# Patient Record
Sex: FEMALE | URBAN - NONMETROPOLITAN AREA
[De-identification: names, ages, dates, MRNs, and addresses within clinical notes are randomized per-mention and may not be internally consistent; named-entity substitution may affect disease eponyms.]

---
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
## 2024-01-13 NOTE — HPI: RASH (ECZEMA)
[facility] MEDICINE  DISCHARGE SUMMARY     Primary Care Physician: Miguel Rodriguez  Admission Date: 1/12/2024   Discharge Provider: Vince Duarte MD Discharge Date: 1/13/2024   Diet:   Active Diet and Nourishment Order   Procedures     Diet     Combination Diet Moderate Consistent Carb (60 g CHO per Meal) Diet; Low Saturated Fat Na <2400mg Diet       Code Status: Full Code   Activity: DCACTIVITY: Activity as tolerated        Condition at Discharge: Stable     REASON FOR PRESENTATION(See Admission Note for Details)     L sided weakness and visual disturbance    PRINCIPAL & ACTIVE DISCHARGE DIAGNOSES     Principal Problem:    Stroke-like symptoms  Active Problems:    TIA (transient ischemic attack)    Benign essential hypertension    Hyperlipidemia LDL goal <70    Obesity    Fatty liver    Type 2 diabetes mellitus (H)      PENDING LABS     Unresulted Labs Ordered in the Past 30 Days of this Admission       No orders found for last 31 day(s).              PROCEDURES ( this hospitalization only)          RECOMMENDATIONS TO OUTPATIENT PROVIDER FOR F/U VISIT     Follow-up Appointments     Follow-up and recommended labs and tests       Follow up with primary care provider, Miguel Rodriguez, within 7 days for   hospital follow- up.  The following labs/tests are recommended: BMP.      Hypertension and diabetes management.              DISPOSITION     Home    SUMMARY OF HOSPITAL COURSE:      50M with HTN, DM2 who presented with L sided weakness and visual disturbance (spinning sensation in periphery of vision) that lasted about 10minutes before resolving on its own.  No new CVA on MRI, but with evidence of old lacunar infarct and microvascular disease.  CTA with intracranial P2 stenoses.  Symptoms resolved and Mr. Stephenson was discharged to home.  He will need close follow-up with primary care for ongoing risk factor management.      #TIA -   -asa 81, plavix 75 x 3 
weeks  -lipitor  -secondary prevention.  Needs better DM and HTN control.     #DM2 - A1c 8.3.    -Resume metformin, start glipizide for now.    -Increase BG checks to TID from qday at home.    -I think would be a good candidate for GLP1a (I.e ozempic).  Is in discussion with PCP for this, and he will follow-up.     #HTN -   -resume hydrochlorothiazide, losartan.    -Goal over time to be < 130/80.  Has monitor at home and provided script for coreg 3.25 BID if blood pressures still running above goal next week.     #Thyroid nodule - outpatient follow-up.   Pt aware of this.     #Morbid obesity - Body mass index is 38.34 kg/m .     #?DEJA - sleep med referral.    Discharge Medications with Med changes:     Current Discharge Medication List        START taking these medications    Details   aspirin 81 MG EC tablet Take 1 tablet (81 mg) by mouth daily  Qty: 30 tablet, Refills: 9    Associated Diagnoses: TIA (transient ischemic attack)      carvedilol (COREG) 3.125 MG tablet Take 1 tablet (3.125 mg) by mouth 2 times daily (with meals) Start if blood pressure is still running greater than 130/80  Qty: 60 tablet, Refills: 1    Associated Diagnoses: Benign essential hypertension      clopidogrel (PLAVIX) 75 MG tablet Take 1 tablet (75 mg) by mouth daily  Qty: 21 tablet, Refills: 0    Associated Diagnoses: TIA (transient ischemic attack)      glipiZIDE (GLUCOTROL) 5 MG tablet Take 1 tablet (5 mg) by mouth every morning (before breakfast)  Qty: 30 tablet, Refills: 3    Associated Diagnoses: Type 2 diabetes mellitus without complication, without long-term current use of insulin (H)           CONTINUE these medications which have NOT CHANGED    Details   atorvastatin (LIPITOR) 10 MG tablet Take 10 mg by mouth every morning      hydrochlorothiazide (HYDRODIURIL) 25 MG tablet Take 25 mg by mouth every morning      losartan (COZAAR) 100 MG tablet Take 100 mg by mouth every morning      metFORMIN (GLUCOPHAGE XR) 500 MG 24 hr tablet 
Take 1,000 mg by mouth 2 times daily (with meals)      Multiple Vitamins-Minerals (MENS MULTIVITAMIN PO) Take 1 tablet by mouth every morning           STOP taking these medications       ibuprofen (ADVIL/MOTRIN) 200 MG tablet Comments:   Reason for Stopping:                     Rationale for medication changes:        As above      Consults     NEUROLOGY IP STROKE CONSULT  SPEECH LANGUAGE PATH ADULT IP CONSULT  PHARMACY IP CONSULT  PHARMACY IP CONSULT  PHARMACY IP CONSULT  PHYSICAL THERAPY ADULT IP CONSULT  OCCUPATIONAL THERAPY ADULT IP CONSULT  REHAB ADMISSIONS LIAISON IP CONSULT  CARE MANAGEMENT / SOCIAL WORK IP CONSULT  NEUROLOGY IP STROKE CONSULT  NEUROLOGY IP CONSULT  SMOKING CESSATION PROGRAM IP CONSULT    Immunizations given this encounter     Most Recent Immunizations   Administered Date(s) Administered     COVID-19 MONOVALENT 12+ ([employer]) 08/27/2021           Anticoagulation Information            SIGNIFICANT IMAGING FINDINGS     Results for orders placed or performed during the hospital encounter of 01/12/24   CTA Head Neck with Contrast    Impression    IMPRESSION:   HEAD CT:  1.  No acute intracranial process or significant change since 2037 hours.    HEAD CTA:   1.  No large vessel occlusion or proximal branch stenosis.  2.  Multifocal severe right P2 stenoses with distal flow present.    NECK CTA:  1.  No large vessel occlusion or hemodynamically significant stenosis.  2.  2.1 cm left thyroid nodule. Outpatient ultrasound is recommended to further characterize.    Dr. Lujan was contacted by me on 1/12/2024 9:38 PM and 9:48 PM CST with the CT head and CTA preliminary report, respectively.   CT Head Perfusion w Contrast - For Tier 2 Stroke    Impression    IMPRESSION:   1.  No acute infarct or tissue at risk per RAPID analysis.  2.  Subjectively there is elevated Tmax in the right PCA distribution corresponding to known stenoses.   MR Brain w/o Contrast    Impression    IMPRESSION:  1.  No acute 
"infarct, mass, hemorrhage, or herniation.  2.  Small probable chronic infarct in the right corona radiata.  3.  A few nonspecific white matter lesions which are most likely due to chronic microvascular ischemic disease.           Discharge Orders        Adult Sleep Eval & Management Referral      Reason for your hospital stay    You were admitted with likely TIA (\"mini stroke\").      You need ongoing management of diabetes and hypertension (goal BP < 130/80)  to reduce your risk of future strokes.     Follow-up and recommended labs and tests     Follow up with primary care provider, Miguel Rodriguez, within 7 days for hospital follow- up.  The following labs/tests are recommended: BMP.      Hypertension and diabetes management.     Activity    Your activity upon discharge: activity as tolerated     Monitor and record    blood glucose 4 times a day, before meals and at bedtime  blood pressure twice daily  Record above measurements and bring to your primary care visits.     Diet    Follow this diet upon discharge: Orders Placed This Encounter      Combination Diet Moderate Consistent Carb (60 g CHO per Meal) Diet; Low Saturated Fat Na <2400mg Diet     Stroke Hospital Follow Up    Please be aware that coverage of these services is subject to the terms and limitations of your health insurance plan.  Call member services at your health plan with any benefit or coverage questions.  [facility] will call you to coordinate care as prescribed by your provider. If you don t hear from a representative within 2 business days, please call [phone].         Examination   Physical Exam   Temp:  [97.8  F (36.6  C)-98.3  F (36.8  C)] 98.3  F (36.8  C)  Pulse:  [] 104  Resp:  [15-26] 18  BP: (132-209)/() 145/81  SpO2:  [94 %-96 %] 95 %  Wt Readings from Last 1 Encounters:   01/13/24 131.8 kg (290 lb 9.1 oz)         Please see EMR for more detailed significant labs, imaging, consultant notes etc.      Vince "
MD Gerald  [facility]    CC:Miguel Rodriguez    
How Severe Is Your Eczema?: mild
Is This A New Presentation, Or A Follow-Up?: Rash

## 2024-06-22 ENCOUNTER — OFFICE VISIT (OUTPATIENT)
Dept: URGENT CARE | Facility: CLINIC | Age: 19
End: 2024-06-22
Payer: COMMERCIAL

## 2024-06-22 VITALS
RESPIRATION RATE: 18 BRPM | SYSTOLIC BLOOD PRESSURE: 106 MMHG | TEMPERATURE: 98.2 F | DIASTOLIC BLOOD PRESSURE: 64 MMHG | HEART RATE: 84 BPM | OXYGEN SATURATION: 100 %

## 2024-06-22 DIAGNOSIS — J02.9 ACUTE PHARYNGITIS, UNSPECIFIED ETIOLOGY: Primary | ICD-10-CM

## 2024-06-22 PROCEDURE — 87880 STREP A ASSAY W/OPTIC: CPT

## 2024-06-22 PROCEDURE — 99203 OFFICE O/P NEW LOW 30 MIN: CPT

## 2024-06-22 PROCEDURE — S9088 SERVICES PROVIDED IN URGENT: HCPCS

## 2024-06-22 RX ORDER — AMOXICILLIN 500 MG/1
500 CAPSULE ORAL EVERY 12 HOURS SCHEDULED
Qty: 20 CAPSULE | Refills: 0 | Status: SHIPPED | OUTPATIENT
Start: 2024-06-22 | End: 2024-07-02

## 2024-06-22 RX ORDER — LORATADINE 10 MG/1
10 TABLET ORAL DAILY
COMMUNITY

## 2024-06-22 NOTE — PROGRESS NOTES
[facility]        NAME: Graciela Hollis is a 18 y.o. female  : 2005    MRN: [id number]  DATE: 2024  TIME: 2:50 PM    Assessment and Plan   Acute pharyngitis, unspecified etiology [J02.9]  1. Acute pharyngitis, unspecified etiology  amoxicillin (AMOXIL) 500 mg capsule        Based on appearance of oropharynx and tonsils, patient was placed on antibiotics.  Will follow-up with primary care provider if not better in approximately 3 days.     Patient Instructions     Strep throat is contagious. It's spread through droplets such as when you sneeze ann cough. It is also shared through shared food and drinks. You should avoid sharing any cups, utensils, drinks/food etc. It can also be picked up from surfaces which have been touched if someone sneezes into their hand then touches the surface. Once starting antibiotics strep throat usually is no longer contagious after 24-48 hours.     You should replace your toothbrush after strep throat. I recommend you replace it after 2-3 days of antibiotic use. Be sure to treat your symptoms to help you feel better faster. Making sure you get plenty of fluids, and treating any fevers with normal over the counter medications. You may also try over the counter throat spray or throat lozenges if appropriate to help sooth the throat. (Throat lozenges are not recommended in young children as they can be a choking hazard).     You may take over the counter Tylenol (Acetaminophen) and/or Motrin (Ibuprofen) as needed, as directed on packaging.      Warm salt water gargles for the throat pain.       Follow up with PCP in 3-5 days.  Proceed to  ER if symptoms worsen.    If tests are performed, our office will contact you with results only if changes need to made to the care plan discussed with you at the visit. You can review your full results on [facility].    Chief Complaint     Chief Complaint   Patient presents with    Sore Throat    Mass     Feels like  has lumps on both sides of the inside of her throat  Got much worse yesterday         History of Present Illness       18-year-old female patient presents complaining of sore throat, left greater than right side.  Feels like she has lumps on both sides of the inside of her throat and it got much worse today.        Review of Systems   Review of Systems   Constitutional:  Negative for activity change, appetite change, chills, fatigue and fever.   HENT:  Positive for ear pain, sore throat, trouble swallowing (able to swallow-pain with swallowing) and voice change. Negative for congestion, drooling and ear discharge.    Eyes:  Negative for pain, discharge, redness and itching.   Respiratory:  Negative for cough and shortness of breath.    Gastrointestinal:  Negative for abdominal pain, diarrhea, nausea and vomiting.   Musculoskeletal:  Positive for myalgias and neck pain (bilateral, left greater than right). Negative for arthralgias.   Skin:  Negative for color change.   Neurological:  Negative for headaches.         Current Medications       Current Outpatient Medications:     amoxicillin (AMOXIL) 500 mg capsule, Take 1 capsule (500 mg total) by mouth every 12 (twelve) hours for 10 days, Disp: 20 capsule, Rfl: 0    loratadine (CLARITIN) 10 mg tablet, Take 10 mg by mouth daily, Disp: , Rfl:     Current Allergies     Allergies as of 06/22/2024    (No Known Allergies)            The following portions of the patient's history were reviewed and updated as appropriate: allergies, current medications, past family history, past medical history, past social history, past surgical history and problem list.     History reviewed. No pertinent past medical history.    History reviewed. No pertinent surgical history.    History reviewed. No pertinent family history.      Medications have been verified.        Objective   /64   Pulse 84   Temp 98.2 °F (36.8 °C)   Resp 18   SpO2 100%        Physical Exam     Physical  Exam  Vitals and nursing note reviewed.   Constitutional:       General: She is not in acute distress.     Appearance: She is well-developed. She is obese. She is not ill-appearing, toxic-appearing or diaphoretic.   HENT:      Head: Normocephalic and atraumatic.      Right Ear: Ear canal and external ear normal. A middle ear effusion is present. Tympanic membrane is injected. Tympanic membrane is not erythematous or bulging.      Left Ear: Ear canal and external ear normal. A middle ear effusion is present. Tympanic membrane is injected. Tympanic membrane is not erythematous or bulging.      Nose: No congestion or rhinorrhea.      Mouth/Throat:      Mouth: Mucous membranes are moist.      Pharynx: Uvula midline. Pharyngeal swelling, oropharyngeal exudate and posterior oropharyngeal erythema present. No uvula swelling.      Tonsils: Tonsillar exudate present. 2+ on the right. 2+ on the left.   Eyes:      Conjunctiva/sclera: Conjunctivae normal.      Pupils: Pupils are equal, round, and reactive to light.   Cardiovascular:      Rate and Rhythm: Normal rate and regular rhythm.      Heart sounds: Normal heart sounds.   Pulmonary:      Effort: Pulmonary effort is normal.      Breath sounds: Normal breath sounds.   Abdominal:      General: Bowel sounds are normal.      Palpations: Abdomen is soft.   Musculoskeletal:      Cervical back: Normal range of motion and neck supple.   Lymphadenopathy:      Cervical: No cervical adenopathy.   Skin:     General: Skin is warm and dry.      Capillary Refill: Capillary refill takes less than 2 seconds.   Neurological:      General: No focal deficit present.      Mental Status: She is alert and oriented to person, place, and time.

## 2024-06-22 NOTE — PATIENT INSTRUCTIONS
Strep throat is contagious. It's spread through droplets such as when you sneeze ann cough. It is also shared through shared food and drinks. You should avoid sharing any cups, utensils, drinks/food etc. It can also be picked up from surfaces which have been touched if someone sneezes into their hand then touches the surface. Once starting antibiotics strep throat usually is no longer contagious after 24-48 hours.     You should replace your toothbrush after strep throat. I recommend you replace it after 2-3 days of antibiotic use. Be sure to treat your symptoms to help you feel better faster. Making sure you get plenty of fluids, and treating any fevers with normal over the counter medications. You may also try over the counter throat spray or throat lozenges if appropriate to help sooth the throat. (Throat lozenges are not recommended in young children as they can be a choking hazard).     You may take over the counter Tylenol (Acetaminophen) and/or Motrin (Ibuprofen) as needed, as directed on packaging.      Warm salt water gargles for the throat pain.

## 2024-11-01 ENCOUNTER — OFFICE VISIT (OUTPATIENT)
Dept: URGENT CARE | Facility: CLINIC | Age: 19
End: 2024-11-01
Payer: COMMERCIAL

## 2024-11-01 VITALS
HEART RATE: 101 BPM | SYSTOLIC BLOOD PRESSURE: 110 MMHG | RESPIRATION RATE: 18 BRPM | OXYGEN SATURATION: 98 % | DIASTOLIC BLOOD PRESSURE: 68 MMHG | TEMPERATURE: 98.6 F

## 2024-11-01 DIAGNOSIS — R05.1 ACUTE COUGH: Primary | ICD-10-CM

## 2024-11-01 DIAGNOSIS — J06.9 VIRAL URI: ICD-10-CM

## 2024-11-01 LAB
SARS-COV-2 AG UPPER RESP QL IA: NEGATIVE
VALID CONTROL: NORMAL

## 2024-11-01 PROCEDURE — S9088 SERVICES PROVIDED IN URGENT: HCPCS | Performed by: FAMILY MEDICINE

## 2024-11-01 PROCEDURE — 99213 OFFICE O/P EST LOW 20 MIN: CPT | Performed by: FAMILY MEDICINE

## 2024-11-01 PROCEDURE — 87811 SARS-COV-2 COVID19 W/OPTIC: CPT

## 2024-11-01 RX ORDER — FLUTICASONE PROPIONATE 50 MCG
2 SPRAY, SUSPENSION (ML) NASAL DAILY
Qty: 11.1 ML | Refills: 0 | Status: SHIPPED | OUTPATIENT
Start: 2024-11-01

## 2024-11-01 RX ORDER — DEXTROMETHORPHAN HYDROBROMIDE AND PROMETHAZINE HYDROCHLORIDE 15; 6.25 MG/5ML; MG/5ML
5 SYRUP ORAL 4 TIMES DAILY PRN
Qty: 118 ML | Refills: 0 | Status: SHIPPED | OUTPATIENT
Start: 2024-11-01

## 2024-11-01 NOTE — PATIENT INSTRUCTIONS
"  Take prescribed medication as instructed.  Vitamin D3 2000 IU daily  Vitamin C 1000mg twice per day  Multivitamin daily  Some studies suggest that Zinc 12.5-15mg every 2 hours while awake x 5 days may shorten the duration cold symptoms by 1-2 days.   Fluids and rest  Nasal saline spray; Afrin if severe congestion (do not use for more than 3 days)  Over the counter decongestant  Tylenol/Ibuprofen for pain/fever  Salt water gargles and chloraseptic spray  Throat Coat Tea  Warm compresses over sinuses  Steam treatment (utilize proper safety precautions when in contact with hot water/steam)     Follow up with PCP in 3-5 days.  Proceed to  ER if symptoms worsen.    If tests are performed, our office will contact you with results only if changes need to made to the care plan discussed with you at the visit. You can review your full results on [facility].  Patient Education     Headaches in adults   The Basics   Written by the doctors and editors at [facility]   Are there different types of headaches? -- Yes. There are different types of headaches. The most common types are:   Tension headaches - These cause pressure or tightness on both sides of the head.   Migraine headaches - Migraine is a condition that involves a headache as well as other symptoms. Migraine headaches, or \"attacks,\" often start mild and then get worse. They often affect just 1 side of the head. The pain often feels like it is pounding or throbbing. Routine activities like walking or climbing stairs can make the headache worse. Migraine can also cause nausea or vomiting, or make you sensitive to light and sound.   Cluster headaches - These affect 1 side of the head. They start quickly, happen frequently, and are very painful. They also cause symptoms on the same side of the face where the headache is. For example, you might get a droopy or watery eye, a stuffy nose, or facial sweating on 1 side.   Secondary headaches - Sometimes, a headache is " "related to another health problem. Doctors call this \"secondary headache.\" For example, infections, illnesses, or medicines can cause a headache. Your doctor or nurse will help figure out if your headache is related to another condition.  What can I do to feel better? -- Some people feel better if they:   Take non-prescription pain medicines - Check with your doctor first if you have a health condition or already take prescription medicines.   Rest in a cool, dark, quiet room - This works best for migraine attacks. Some people find it helps to put a cold compress on their head or neck.  Should I see a doctor or nurse? -- Call for an ambulance (in the [place] and [place], call 9-1-1) if:   Your headache starts suddenly, quickly becomes severe, or could be described as \"the worst headache of your life.\"   You also have a seizure, personality changes, or confusion, or you pass out.   You have weakness, numbness, trouble speaking, or trouble seeing. (Migraine can sometimes cause these symptoms, but you should be seen right away the first time that these symptoms happen.)  See a doctor or nurse if:   You get frequent or severe headaches.   Your headache began after you exercised or had a minor injury.   You have new headaches, especially if you are pregnant or older than 50.   You have a fever or stiff neck with your headache.  Depending on your symptoms, your doctor or nurse might want to do tests. This can help them figure out if your headache might be related to another health problem.  What might be causing my headaches? -- Some people find that their headaches are triggered by certain foods or things they do. To get an idea of what might be causing your headaches, you can keep a \"headache diary.\" This involves writing down every time you have a headache and what you ate and did before it started.  Some common headache triggers include:   Stress   Skipping meals or eating too little   Having too little or too much " caffeine   Sleeping too much or too little   Drinking alcohol   Certain foods or drinks   Not drinking enough water  You can also write down what medicine you took for the headache and whether or not it helped.  What can I do to keep from getting headaches? -- If you know what things trigger your headache, avoid those things if possible. For example, it might help to:   Change your eating or sleeping patterns.   Learn relaxation techniques and healthy ways to manage stress.   Make healthy lifestyle changes, like quitting smoking and getting more physical activity.  If your headaches are frequent, severe, or long-lasting, your doctor can suggest ways to try to prevent them. In some cases, medicines can also help.  How are headaches treated? -- There are lots of medicines that can ease the pain of headaches. You can try taking acetaminophen (sample brand name: Tylenol), ibuprofen (sample brand names: Advil, Motrin), or naproxen (sample brand name: Aleve). There are prescription medicines that can help with pain, too.  The right treatment for you will depend on what type of headaches you get, how often you get them, and how bad they are. Some medicines are used to treat headaches, and others are taken every day to try to prevent headaches.  If you get headaches often, work with your doctor to find a treatment that helps. Do not try to manage frequent headaches on your own with non-prescription pain medicines. Taking these too often can actually cause more headaches later.  All topics are updated as new evidence becomes available and our peer review process is complete.  This topic retrieved from [employer] on: Mar 24, 2024.  Topic 92043 Version 10.0  Release: 32.2.4 - C32.82  © 2024 UpToDate, Inc. and/or its affiliates. All rights reserved.  Consumer Information Use and Disclaimer   Disclaimer: This generalized information is a limited summary of diagnosis, treatment, and/or medication information. It is not meant to be  "comprehensive and should be used as a tool to help the user understand and/or assess potential diagnostic and treatment options. It does NOT include all information about conditions, treatments, medications, side effects, or risks that may apply to a specific patient. It is not intended to be medical advice or a substitute for the medical advice, diagnosis, or treatment of a health care provider based on the health care provider's examination and assessment of a patient's specific and unique circumstances. Patients must speak with a health care provider for complete information about their health, medical questions, and treatment options, including any risks or benefits regarding use of medications. This information does not endorse any treatments or medications as safe, effective, or approved for treating a specific patient. UpToDate, Inc. and its affiliates disclaim any warranty or liability relating to this information or the use thereof.The use of this information is governed by the Terms of Use, available at https://www.[employer].com/en/know/clinical-effectiveness-terms. 2024© UpToDate, Inc. and its affiliates and/or licensors. All rights reserved.  Copyright   © 2024 UpToDate, Inc. and/or its affiliates. All rights reserved.  Patient Education     Cough, runny nose, and the common cold   The Basics   Written by the doctors and editors at [facility]   What causes cough, runny nose, and other symptoms of the common cold? -- These symptoms are usually caused by a virus. Doctors also use the term \"viral upper respiratory infection\" or \"viral URI.\" Lots of different viruses can get into your nose, mouth, throat, or airways and cause cold symptoms.  Most people get better from a cold without any lasting problems. Even so, having a cold can be uncomfortable.  What are the symptoms of the common cold? -- Symptoms can include:   Sneezing   Coughing   Sniffling and runny nose   Sore throat   Chest congestion  In " children, the common cold can also cause a fever. But adults do not usually get a fever when they have a cold.  Colds usually last about 3 to 7 days in adults and 10 days in children. But some people have symptoms for up to 2 weeks.  How can I tell if I have a cold or something else? -- Sometimes, it can be hard to tell if you have a cold or something else. Some cold symptoms can also be caused by other illnesses, such as COVID-19, the flu, or strep throat.  There are sometimes clues that can help you tell the difference:   COVID-19 often starts out very similar to a cold, although it can also cause a fever. If you have cold symptoms and have been around someone with COVID-19, you should get a test to find out if you have it, too.   The flu is more likely to cause fever, body aches, and extreme tiredness than a cold.   Strep throat usually causes severe throat pain. It can also cause a fever and swollen glands in the neck. People with strep throat usually do not have other cold symptoms like a stuffy nose or cough.  If you think that you might have an illness other than the common cold, call your doctor or nurse. They can tell you what to do.  Can medicine help with a cold? -- Usually, a cold gets better on its own and does not need treatment. Because colds are usually caused by viruses, antibiotics will not help.  If you are a teen or an adult, you can try cough and cold medicines that you can get without a prescription. These medicines might help with your symptoms. But they can't cure your cold, or help you get well faster.  If you decide to try non-prescription cold medicines:   Read the directions on the label, and follow them carefully.   Do not combine 2 or more medicines that have acetaminophen in them. If you take too much acetaminophen, it can damage your liver.   If you have a heart condition, have high blood pressure, or take any prescription medicines, talk to your doctor or pharmacist before taking  cold medicine. They can tell you which medicines are safe.  Some medicines are not safe for children:   If your child is younger than 6, do not give them any cold medicines. These medicines are not safe for young children. Even if your child is older than 6, cough and cold medicines are unlikely to help.   Never give aspirin to any child younger than 18 years old. In children, aspirin can cause a life-threatening condition called Reye syndrome.   When giving your child acetaminophen or other non-prescription medicines, never give more than the recommended dose.  Is there anything I can do on my own to feel better? -- Yes. You can:   Get plenty of rest.   Drink lots of fluids (water, juice, or broth) to stay hydrated. This will help replace any fluids lost if you have a runny nose or sweating from a fever. Warm tea or soup can help soothe a sore throat.   If the air in your home feels dry, use a cool-mist humidifier. This can help a stuffy nose and make it easier to breathe.   Use saline nose drops or spray to relieve stuffiness.   Avoid smoking, and stay away from places where people are smoking.  Can the common cold lead to more serious problems? -- In some cases, yes. In some people, having a cold can lead to:   Ear infections   Worse asthma symptoms   Sinus infections   Pneumonia or bronchitis (infections of the lungs)  Can colds be prevented? -- There are some things you can do to keep germs from spreading:   Wash your hands with soap and water often (figure 1) - This can also help prevent the spread of other illnesses like the flu and COVID-19.   Cover your cough - Cough into your elbow instead of your hands. Teach children to do this, too. Throw away used tissues right away.   Clean surfaces - The germs that cause the common cold can live on tables, door handles, and other surfaces for at least 2 hours.   Stay home if you are sick - When you do need to be around other people, consider wearing a face mask until  you are feeling better.  When should I call the doctor? -- Contact your doctor or nurse if you:   Lose your sense of taste or smell   Have a fever of more than 100.4°F (38°C) that comes with shaking chills, loss of appetite, or trouble breathing   Have a very bad sore throat   Have a fever and also have lung disease, such as emphysema or asthma   Have a cough that lasts longer than 10 days or starts getting worse   Have chest pain when you cough or breathe deeply, have trouble breathing, or cough up blood  If you are older than 65, or if you have any chronic medical conditions such as diabetes, contact your doctor or nurse any time you get a long-lasting cough.  Take your child to the emergency department if they:   Become confused or stop responding to you   Have trouble breathing or have to work hard to breathe  Contact your child's doctor or nurse if the child:   Loses their sense of taste or smell or won't eat foods that they ate before   Has a very bad sore throat   Refuses to drink anything for a long time   Is younger than 4 months   Has a fever and is not acting like themselves   Has a cough that lasts for more than 2 weeks and is not getting any better or is getting worse   Has a stuffed or runny nose that gets worse or does not get any better after 10 days   Has red eyes or yellow goop coming out of their eyes   Has ear pain, pulls at their ears, or shows other signs of having an ear infection  All topics are updated as new evidence becomes available and our peer review process is complete.  This topic retrieved from [employer] on: Feb 26, 2024.  Topic 04902 Version 30.0  Release: 32.2.4 - C32.56  © 2024 UpToDate, Inc. and/or its affiliates. All rights reserved.  figure 1: How to wash your hands     Wet your hands with clean water, and apply a small amount of soap. Lather and rub hands together for at least 20 seconds. Clean your wrists, palms, backs of your hands, between your fingers, tips of your fingers,  thumbs, and under and around your nails. Rinse well, and dry your hands using a clean towel.  Graphic 296860 Version 7.0  Consumer Information Use and Disclaimer   Disclaimer: This generalized information is a limited summary of diagnosis, treatment, and/or medication information. It is not meant to be comprehensive and should be used as a tool to help the user understand and/or assess potential diagnostic and treatment options. It does NOT include all information about conditions, treatments, medications, side effects, or risks that may apply to a specific patient. It is not intended to be medical advice or a substitute for the medical advice, diagnosis, or treatment of a health care provider based on the health care provider's examination and assessment of a patient's specific and unique circumstances. Patients must speak with a health care provider for complete information about their health, medical questions, and treatment options, including any risks or benefits regarding use of medications. This information does not endorse any treatments or medications as safe, effective, or approved for treating a specific patient. UpToDate, Inc. and its affiliates disclaim any warranty or liability relating to this information or the use thereof.The use of this information is governed by the Terms of Use, available at https://www.w[employer]Mag[employer]uwer.com/en/know/clinical-effectiveness-terms. 2024© UpToDate, Inc. and its affiliates and/or licensors. All rights reserved.  Copyright   © 2024 UpToDate, Inc. and/or its affiliates. All rights reserved.

## 2024-11-01 NOTE — PROGRESS NOTES
[facility]        NAME: Graciela Hollis is a 18 y.o. female  : 2005    MRN: [id number]  DATE: 2024  TIME: 2:47 PM    Assessment and Plan   Acute cough [R05.1]  1. Acute cough  Poct Covid 19 Rapid Antigen Test    promethazine-dextromethorphan (PHENERGAN-DM) 6.25-15 mg/5 mL oral syrup      2. Viral URI  fluticasone (FLONASE) 50 mcg/act nasal spray        Cough, congestion, body aches, chills that began last night.  Rapid COVID test negative here in the clinic.  Lungs clear to auscultation without wheezing, rales, rhonchi.  Pulse ox 98% on room air without tachypnea or tachycardia.  She is well-appearing.  No acute distress.  Sending in Phenergan DM and Flonase for symptom control.  Advised to follow-up with family doctor.  Advised to go to the emergency room if any symptoms worsen.    Patient Instructions     Take prescribed medication as instructed.  Vitamin D3 2000 IU daily  Vitamin C 1000mg twice per day  Multivitamin daily  Some studies suggest that Zinc 12.5-15mg every 2 hours while awake x 5 days may shorten the duration cold symptoms by 1-2 days.   Fluids and rest  Nasal saline spray; Afrin if severe congestion (do not use for more than 3 days)  Over the counter decongestant  Tylenol/Ibuprofen for pain/fever  Salt water gargles and chloraseptic spray  Throat Coat Tea  Warm compresses over sinuses  Steam treatment (utilize proper safety precautions when in contact with hot water/steam)     Follow up with PCP in 3-5 days.  Proceed to  ER if symptoms worsen.    If tests are performed, our office will contact you with results only if changes need to made to the care plan discussed with you at the visit. You can review your full results on [facility].    Chief Complaint     Chief Complaint   Patient presents with    Headache     congestion         History of Present Illness       18-year-old female presents to the clinic for 1 day of nasal congestion, headache, cough, body  aches, nausea.  Took ibuprofen x 2 over-the-counter this morning.  Denies sick contacts.  Did not take any at home COVID testing.  Denies any fever, urinary, sore throat, chest pain, shortness of breath, abdominal pain, vomiting, diarrhea.  Denies history of asthma.    Headache      Review of Systems   Review of Systems   Constitutional:  Positive for chills. Negative for diaphoresis, fatigue and fever.   HENT:  Positive for congestion. Negative for ear discharge, ear pain and sore throat.    Eyes: Negative.    Respiratory:  Positive for cough. Negative for shortness of breath and wheezing.    Cardiovascular: Negative.    Gastrointestinal:  Positive for nausea. Negative for abdominal distention, abdominal pain, constipation, diarrhea and vomiting.   Genitourinary: Negative.    Musculoskeletal:  Positive for myalgias.   Skin: Negative.    Neurological:  Positive for headaches. Negative for dizziness, syncope, facial asymmetry, speech difficulty, weakness and light-headedness.         Current Medications       Current Outpatient Medications:     fluticasone (FLONASE) 50 mcg/act nasal spray, 2 sprays into each nostril daily, Disp: 11.1 mL, Rfl: 0    promethazine-dextromethorphan (PHENERGAN-DM) 6.25-15 mg/5 mL oral syrup, Take 5 mL by mouth 4 (four) times a day as needed for cough, Disp: 118 mL, Rfl: 0    loratadine (CLARITIN) 10 mg tablet, Take 10 mg by mouth daily, Disp: , Rfl:     Current Allergies     Allergies as of 11/01/2024 - Reviewed 06/22/2024   Allergen Reaction Noted    Cat hair extract Rash 12/11/2019            The following portions of the patient's history were reviewed and updated as appropriate: allergies, current medications, past family history, past medical history, past social history, past surgical history and problem list.     No past medical history on file.    No past surgical history on file.    No family history on file.      Medications have been verified.        Objective   /68    Pulse 101   Temp 98.6 °F (37 °C)   Resp 18   SpO2 98%        Physical Exam     Physical Exam  Constitutional:       General: She is awake. She is not in acute distress.     Appearance: Normal appearance. She is not ill-appearing, toxic-appearing or diaphoretic.   HENT:      Head: Normocephalic and atraumatic.      Right Ear: Tympanic membrane, ear canal and external ear normal.      Left Ear: Tympanic membrane, ear canal and external ear normal.      Nose: Congestion present.      Mouth/Throat:      Mouth: Mucous membranes are moist.      Pharynx: Oropharynx is clear. No oropharyngeal exudate or posterior oropharyngeal erythema.   Eyes:      Extraocular Movements: Extraocular movements intact.      Conjunctiva/sclera: Conjunctivae normal.      Pupils: Pupils are equal, round, and reactive to light.   Cardiovascular:      Rate and Rhythm: Normal rate and regular rhythm.      Pulses: Normal pulses.      Heart sounds: Normal heart sounds. No murmur heard.     No friction rub. No gallop.   Pulmonary:      Effort: Pulmonary effort is normal. No tachypnea, bradypnea, accessory muscle usage, prolonged expiration, respiratory distress or retractions.      Breath sounds: Normal breath sounds and air entry. No stridor, decreased air movement or transmitted upper airway sounds. No decreased breath sounds, wheezing, rhonchi or rales.   Chest:      Chest wall: No tenderness.   Abdominal:      General: Abdomen is flat. Bowel sounds are normal.      Palpations: Abdomen is soft.      Tenderness: There is no abdominal tenderness. There is no guarding or rebound.   Musculoskeletal:      Cervical back: Normal range of motion and neck supple. No tenderness.   Lymphadenopathy:      Cervical: Cervical adenopathy present.   Skin:     General: Skin is warm and dry.      Capillary Refill: Capillary refill takes less than 2 seconds.      Coloration: Skin is not pale.      Findings: No erythema or rash.   Neurological:      General: No  focal deficit present.      Mental Status: She is alert, oriented to person, place, and time and easily aroused. Mental status is at baseline.      Cranial Nerves: No cranial nerve deficit.      Sensory: No sensory deficit.      Motor: No weakness.      Coordination: Coordination normal.      Gait: Gait normal.      Deep Tendon Reflexes: Reflexes normal.   Psychiatric:         Mood and Affect: Mood normal.         Behavior: Behavior normal. Behavior is cooperative.

## 2024-11-28 DIAGNOSIS — J06.9 VIRAL URI: ICD-10-CM

## 2025-01-27 RX ORDER — FLUTICASONE PROPIONATE 50 MCG
SPRAY, SUSPENSION (ML) NASAL
Qty: 16 ML | OUTPATIENT
Start: 2025-01-27